# Patient Record
Sex: MALE | Race: WHITE | Employment: UNEMPLOYED | URBAN - METROPOLITAN AREA
[De-identification: names, ages, dates, MRNs, and addresses within clinical notes are randomized per-mention and may not be internally consistent; named-entity substitution may affect disease eponyms.]

---

## 2017-01-10 ENCOUNTER — HOSPITAL ENCOUNTER (EMERGENCY)
Age: 27
Discharge: HOME | End: 2017-01-10
Attending: EMERGENCY MEDICINE | Admitting: EMERGENCY MEDICINE
Payer: COMMERCIAL

## 2017-01-10 ENCOUNTER — APPOINTMENT (OUTPATIENT)
Dept: CT IMAGING | Age: 27
End: 2017-01-10
Attending: EMERGENCY MEDICINE
Payer: COMMERCIAL

## 2017-01-10 VITALS
OXYGEN SATURATION: 95 % | WEIGHT: 255 LBS | HEART RATE: 64 BPM | RESPIRATION RATE: 16 BRPM | HEIGHT: 75 IN | DIASTOLIC BLOOD PRESSURE: 64 MMHG | BODY MASS INDEX: 31.71 KG/M2 | TEMPERATURE: 97.5 F | SYSTOLIC BLOOD PRESSURE: 106 MMHG

## 2017-01-10 DIAGNOSIS — R11.2 NON-INTRACTABLE VOMITING WITH NAUSEA, UNSPECIFIED VOMITING TYPE: ICD-10-CM

## 2017-01-10 DIAGNOSIS — R10.84 ABDOMINAL PAIN, GENERALIZED: Primary | ICD-10-CM

## 2017-01-10 LAB
ALBUMIN SERPL BCP-MCNC: 3.8 G/DL (ref 3.5–5)
ALBUMIN/GLOB SERPL: 0.9 {RATIO} (ref 1.1–2.2)
ALP SERPL-CCNC: 54 U/L (ref 45–117)
ALT SERPL-CCNC: 39 U/L (ref 12–78)
ANION GAP BLD CALC-SCNC: 7 MMOL/L (ref 5–15)
APPEARANCE UR: CLEAR
AST SERPL W P-5'-P-CCNC: 27 U/L (ref 15–37)
BACTERIA URNS QL MICRO: NEGATIVE /HPF
BASOPHILS # BLD AUTO: 0 K/UL (ref 0–0.1)
BASOPHILS # BLD: 1 % (ref 0–1)
BILIRUB SERPL-MCNC: 0.4 MG/DL (ref 0.2–1)
BILIRUB UR QL: NEGATIVE
BUN SERPL-MCNC: 15 MG/DL (ref 6–20)
BUN/CREAT SERPL: 14 (ref 12–20)
CALCIUM SERPL-MCNC: 9.1 MG/DL (ref 8.5–10.1)
CHLORIDE SERPL-SCNC: 103 MMOL/L (ref 97–108)
CO2 SERPL-SCNC: 29 MMOL/L (ref 21–32)
COLOR UR: NORMAL
CREAT SERPL-MCNC: 1.08 MG/DL (ref 0.7–1.3)
EOSINOPHIL # BLD: 0.1 K/UL (ref 0–0.4)
EOSINOPHIL NFR BLD: 2 % (ref 0–7)
EPITH CASTS URNS QL MICRO: NORMAL /LPF
ERYTHROCYTE [DISTWIDTH] IN BLOOD BY AUTOMATED COUNT: 12.1 % (ref 11.5–14.5)
GLOBULIN SER CALC-MCNC: 4.1 G/DL (ref 2–4)
GLUCOSE SERPL-MCNC: 83 MG/DL (ref 65–100)
GLUCOSE UR STRIP.AUTO-MCNC: NEGATIVE MG/DL
HCT VFR BLD AUTO: 43.4 % (ref 36.6–50.3)
HGB BLD-MCNC: 14.7 G/DL (ref 12.1–17)
HGB UR QL STRIP: NEGATIVE
HYALINE CASTS URNS QL MICRO: NORMAL /LPF (ref 0–5)
KETONES UR QL STRIP.AUTO: NEGATIVE MG/DL
LEUKOCYTE ESTERASE UR QL STRIP.AUTO: NEGATIVE
LIPASE SERPL-CCNC: 89 U/L (ref 73–393)
LYMPHOCYTES # BLD AUTO: 30 % (ref 12–49)
LYMPHOCYTES # BLD: 1.3 K/UL (ref 0.8–3.5)
MCH RBC QN AUTO: 30.7 PG (ref 26–34)
MCHC RBC AUTO-ENTMCNC: 33.9 G/DL (ref 30–36.5)
MCV RBC AUTO: 90.6 FL (ref 80–99)
MONOCYTES # BLD: 0.7 K/UL (ref 0–1)
MONOCYTES NFR BLD AUTO: 16 % (ref 5–13)
NEUTS SEG # BLD: 2.3 K/UL (ref 1.8–8)
NEUTS SEG NFR BLD AUTO: 51 % (ref 32–75)
NITRITE UR QL STRIP.AUTO: NEGATIVE
PH UR STRIP: 5.5 [PH] (ref 5–8)
PLATELET # BLD AUTO: 195 K/UL (ref 150–400)
POTASSIUM SERPL-SCNC: 3.8 MMOL/L (ref 3.5–5.1)
PROT SERPL-MCNC: 7.9 G/DL (ref 6.4–8.2)
PROT UR STRIP-MCNC: NEGATIVE MG/DL
RBC # BLD AUTO: 4.79 M/UL (ref 4.1–5.7)
RBC #/AREA URNS HPF: NORMAL /HPF (ref 0–5)
SODIUM SERPL-SCNC: 139 MMOL/L (ref 136–145)
SP GR UR REFRACTOMETRY: 1.02 (ref 1–1.03)
UROBILINOGEN UR QL STRIP.AUTO: 0.2 EU/DL (ref 0.2–1)
WBC # BLD AUTO: 4.4 K/UL (ref 4.1–11.1)
WBC URNS QL MICRO: NORMAL /HPF (ref 0–4)

## 2017-01-10 PROCEDURE — 96361 HYDRATE IV INFUSION ADD-ON: CPT

## 2017-01-10 PROCEDURE — 83690 ASSAY OF LIPASE: CPT | Performed by: EMERGENCY MEDICINE

## 2017-01-10 PROCEDURE — 85025 COMPLETE CBC W/AUTO DIFF WBC: CPT | Performed by: EMERGENCY MEDICINE

## 2017-01-10 PROCEDURE — 81001 URINALYSIS AUTO W/SCOPE: CPT | Performed by: EMERGENCY MEDICINE

## 2017-01-10 PROCEDURE — 74177 CT ABD & PELVIS W/CONTRAST: CPT

## 2017-01-10 PROCEDURE — 36415 COLL VENOUS BLD VENIPUNCTURE: CPT | Performed by: EMERGENCY MEDICINE

## 2017-01-10 PROCEDURE — 96375 TX/PRO/DX INJ NEW DRUG ADDON: CPT

## 2017-01-10 PROCEDURE — 74011250636 HC RX REV CODE- 250/636: Performed by: EMERGENCY MEDICINE

## 2017-01-10 PROCEDURE — 80053 COMPREHEN METABOLIC PANEL: CPT | Performed by: EMERGENCY MEDICINE

## 2017-01-10 PROCEDURE — 96374 THER/PROPH/DIAG INJ IV PUSH: CPT

## 2017-01-10 PROCEDURE — 74011636320 HC RX REV CODE- 636/320: Performed by: RADIOLOGY

## 2017-01-10 PROCEDURE — 99284 EMERGENCY DEPT VISIT MOD MDM: CPT

## 2017-01-10 RX ORDER — ONDANSETRON 2 MG/ML
4 INJECTION INTRAMUSCULAR; INTRAVENOUS
Status: COMPLETED | OUTPATIENT
Start: 2017-01-10 | End: 2017-01-10

## 2017-01-10 RX ORDER — ONDANSETRON 4 MG/1
4 TABLET, ORALLY DISINTEGRATING ORAL
Qty: 10 TAB | Refills: 0 | Status: SHIPPED | OUTPATIENT
Start: 2017-01-10 | End: 2022-05-06

## 2017-01-10 RX ORDER — KETOROLAC TROMETHAMINE 30 MG/ML
15 INJECTION, SOLUTION INTRAMUSCULAR; INTRAVENOUS
Status: COMPLETED | OUTPATIENT
Start: 2017-01-10 | End: 2017-01-10

## 2017-01-10 RX ADMIN — KETOROLAC TROMETHAMINE 15 MG: 30 INJECTION, SOLUTION INTRAMUSCULAR at 13:01

## 2017-01-10 RX ADMIN — ONDANSETRON 4 MG: 2 INJECTION INTRAMUSCULAR; INTRAVENOUS at 13:01

## 2017-01-10 RX ADMIN — IOPAMIDOL 100 ML: 755 INJECTION, SOLUTION INTRAVENOUS at 14:09

## 2017-01-10 RX ADMIN — SODIUM CHLORIDE 1000 ML: 900 INJECTION, SOLUTION INTRAVENOUS at 13:00

## 2017-01-10 NOTE — ED PROVIDER NOTES
HPI Comments: 32 y.o. male with past medical history significant for patent foramen ovale who presents to the ED with chief complaint of constipation. Pt reports constipation onset 2 days ago accompanied by abdominal pain, vomiting 2 nights ago, and diarrhea onset yesterday morning (a couple episodes today), says he is passing \"small amounts of stool\" but has not had any improvement in his sx. Pt states he was seen at [facility] for his sx and had an x-ray done, says he was referred to the ED for further evaluation and for a CT scan to rule out a possible partial bowel obstruction. Pt denies hx of bowel obstruction. Pt states he has been NPO for about 14 hours besides some water but says he has been able to tolerate the water. Pt states he has hx of appendectomy about 2 years ago but denies hx of any other abdominal surgeries. Pt notes he has also recently had cold sx. There are no other acute medical complaints voiced at this time. Social Hx: Denies use of EtOH or drugs. PCP: Miesha Zeng MD    Note written by Kobe Burciaga, as dictated by Henry Cameron MD 12:07 PM     The history is provided by the patient. Past Medical History:   Diagnosis Date    Patent foramen ovale      with right to left shunt on bubble study in 2012 (at [facility])       Past Surgical History:   Procedure Laterality Date    Hx heent      Hx septoplasty      Hx appendectomy           Family History:   Problem Relation Age of Onset    Hypertension Father        Social History     Social History    Marital status: SINGLE     Spouse name: N/A    Number of children: N/A    Years of education: N/A     Occupational History    Not on file.      Social History Main Topics    Smoking status: Never Smoker    Smokeless tobacco: Not on file    Alcohol use No    Drug use: No    Sexual activity: Not on file     Other Topics Concern    Not on file     Social History Narrative         ALLERGIES: Review of patient's allergies indicates no known allergies. Review of Systems   Gastrointestinal: Positive for abdominal pain, constipation, diarrhea and vomiting. All other systems reviewed and are negative. Vitals:    01/10/17 1157   BP: 130/61   Pulse: 65   Resp: 16   Temp: 97.5 °F (36.4 °C)   SpO2: 96%   Weight: 115.7 kg (255 lb)   Height: 6' 3\" (1.905 m)            Physical Exam   Constitutional: He is oriented to person, place, and time. He appears well-developed and well-nourished. No distress. NAD, AxOx4, speaking in complete sentences     HENT:   Head: Normocephalic and atraumatic. Mouth/Throat: Oropharynx is clear and moist. No oropharyngeal exudate. Eyes: Conjunctivae and EOM are normal. Pupils are equal, round, and reactive to light. Right eye exhibits no discharge. Left eye exhibits no discharge. Neck: Normal range of motion. Neck supple. Cardiovascular: Normal rate, regular rhythm, normal heart sounds and intact distal pulses. Exam reveals no gallop and no friction rub. No murmur heard. Pulmonary/Chest: Effort normal and breath sounds normal. No respiratory distress. He has no wheezes. He has no rales. He exhibits no tenderness. Abdominal: Soft. Bowel sounds are normal. He exhibits no distension and no mass. There is no tenderness. There is no rebound and no guarding. Min ttp;     Neg peritoneal signs   Genitourinary:   Genitourinary Comments: Pt denies urinary/ Testicular/ scrotal or penile  complaints   Musculoskeletal: Normal range of motion. He exhibits no edema, tenderness or deformity. Lymphadenopathy:     He has no cervical adenopathy. Neurological: He is alert and oriented to person, place, and time. He has normal reflexes. No cranial nerve deficit. Coordination normal.   Skin: Skin is warm and dry. No rash noted. No erythema. Psychiatric: He has a normal mood and affect. Nursing note and vitals reviewed.        [facility]  ED Course       Procedures    12:49 PM  'doing better'; awaiting results;        2:47 PM 'doing better'; agrees w/ plans';   Tina Pepper's  results have been reviewed with him. He has been counseled regarding his diagnosis. He verbally conveys understanding and agreement of the signs, symptoms, diagnosis, treatment and prognosis and additionally agrees to Call/ Arrange follow up as recommended with Dr. Kathy Bro MD in 24 - 48 hours. He also agrees with the care-plan and conveys that all of his questions have been answered. I have also put together some discharge instructions for him that include: 1) educational information regarding their diagnosis, 2) how to care for their diagnosis at home, as well a 3) list of reasons why they would want to return to the ED prior to their follow-up appointment, should their condition change or for concerns.

## 2017-01-10 NOTE — DISCHARGE INSTRUCTIONS
Abdominal Pain: Care Instructions  Your Care Instructions    Abdominal pain has many possible causes. Some aren't serious and get better on their own in a few days. Others need more testing and treatment. If your pain continues or gets worse, you need to be rechecked and may need more tests to find out what is wrong. You may need surgery to correct the problem. Don't ignore new symptoms, such as fever, nausea and vomiting, urination problems, pain that gets worse, and dizziness. These may be signs of a more serious problem. Your doctor may have recommended a follow-up visit in the next 8 to 12 hours. If you are not getting better, you may need more tests or treatment. The doctor has checked you carefully, but problems can develop later. If you notice any problems or new symptoms, get medical treatment right away. Follow-up care is a key part of your treatment and safety. Be sure to make and go to all appointments, and call your doctor if you are having problems. It's also a good idea to know your test results and keep a list of the medicines you take. How can you care for yourself at home? · Rest until you feel better. · To prevent dehydration, drink plenty of fluids, enough so that your urine is light yellow or clear like water. Choose water and other caffeine-free clear liquids until you feel better. If you have kidney, heart, or liver disease and have to limit fluids, talk with your doctor before you increase the amount of fluids you drink. · If your stomach is upset, eat mild foods, such as rice, dry toast or crackers, bananas, and applesauce. Try eating several small meals instead of two or three large ones. · Wait until 48 hours after all symptoms have gone away before you have spicy foods, alcohol, and drinks that contain caffeine. · Do not eat foods that are high in fat. · Avoid anti-inflammatory medicines such as aspirin, ibuprofen (Advil, Motrin), and naproxen (Aleve).  These can cause stomach upset. Talk to your doctor if you take daily aspirin for another health problem. When should you call for help? Call 911 anytime you think you may need emergency care. For example, call if:  · You passed out (lost consciousness). · You pass maroon or very bloody stools. · You vomit blood or what looks like coffee grounds. · You have new, severe belly pain. Call your doctor now or seek immediate medical care if:  · Your pain gets worse, especially if it becomes focused in one area of your belly. · You have a new or higher fever. · Your stools are black and look like tar, or they have streaks of blood. · You have unexpected vaginal bleeding. · You have symptoms of a urinary tract infection. These may include:  ¨ Pain when you urinate. ¨ Urinating more often than usual.  ¨ Blood in your urine. · You are dizzy or lightheaded, or you feel like you may faint. Watch closely for changes in your health, and be sure to contact your doctor if:  · You are not getting better after 1 day (24 hours). Where can you learn more? Go to http://rockyDuogousaqib.info/. Enter M142 in the search box to learn more about \"Abdominal Pain: Care Instructions. \"  Current as of: May 27, 2016  Content Version: 11.1  © 9529-4211 [employer]. Care instructions adapted under license by [employer] (which disclaims liability or warranty for this information). If you have questions about a medical condition or this instruction, always ask your healthcare professional. [place] [address] any warranty or liability for your use of this information. Nausea and Vomiting: Care Instructions  Your Care Instructions    When you are nauseated, you may feel weak and sweaty and notice a lot of saliva in your mouth. Nausea often leads to vomiting. Most of the time you do not need to worry about nausea and vomiting, but they can be signs of other illnesses.   Two common causes of nausea and vomiting are stomach flu and food poisoning. Nausea and vomiting from viral stomach flu will usually start to improve within 24 hours. Nausea and vomiting from food poisoning may last from 12 to 48 hours. The doctor has checked you carefully, but problems can develop later. If you notice any problems or new symptoms, get medical treatment right away. Follow-up care is a key part of your treatment and safety. Be sure to make and go to all appointments, and call your doctor if you are having problems. It's also a good idea to know your test results and keep a list of the medicines you take. How can you care for yourself at home? · To prevent dehydration, drink plenty of fluids, enough so that your urine is light yellow or clear like water. Choose water and other caffeine-free clear liquids until you feel better. If you have kidney, heart, or liver disease and have to limit fluids, talk with your doctor before you increase the amount of fluids you drink. · Rest in bed until you feel better. · When you are able to eat, try clear soups, mild foods, and liquids until all symptoms are gone for 12 to 48 hours. Other good choices include dry toast, crackers, cooked cereal, and gelatin dessert, such as Jell-O. When should you call for help? Call 911 anytime you think you may need emergency care. For example, call if:  · You passed out (lost consciousness). Call your doctor now or seek immediate medical care if:  · You have symptoms of dehydration, such as:  ¨ Dry eyes and a dry mouth. ¨ Passing only a little dark urine. ¨ Feeling thirstier than usual.  · You have new or worsening belly pain. · You have a new or higher fever. · You vomit blood or what looks like coffee grounds. Watch closely for changes in your health, and be sure to contact your doctor if:  · You have ongoing nausea and vomiting. · Your vomiting is getting worse. · Your vomiting lasts longer than 2 days.   · You are not getting better as expected. Where can you learn more? Go to http://rocky-saqib.info/. Enter 25 [id number] in the search box to learn more about \"Nausea and Vomiting: Care Instructions. \"  Current as of: May 27, 2016  Content Version: 11.1  © 0490-6206 [employer]. Care instructions adapted under license by [employer] (which disclaims liability or warranty for this information). If you have questions about a medical condition or this instruction, always ask your healthcare professional. [place] [address] any warranty or liability for your use of this information. We hope that we have addressed all of your medical concerns. The examination and treatment you received in the Emergency Department were for an emergent problem and were not intended as complete care. It is important that you follow up with your healthcare provider(s) for ongoing care. If your symptoms worsen or do not improve as expected, and you are unable to reach your usual health care provider(s), you should return to the Emergency Department. Today's healthcare is undergoing tremendous change, and patient satisfaction surveys are one of the many tools to assess the quality of medical care. You may receive a survey from the [employer] organization regarding your experience in the Emergency Department. I hope that your experience has been completely positive, particularly the medical care that I provided. As such, please participate in the survey; anything less than excellent does not meet my expectations or intentions. [facility] and 8 [facility] participate in nationally recognized quality of care measures. If your blood pressure is greater than 120/80, as reported below, we urge that you seek medical care to address the potential of high blood pressure, commonly known as hypertension.    Hypertension can be hereditary or can be caused by certain medical conditions, pain, stress, or \"white coat syndrome. \"       Please make an appointment with your health care provider(s) for follow up of your Emergency Department visit. VITALS:   Patient Vitals for the past 8 hrs:   Temp Pulse Resp BP SpO2   01/10/17 1157 97.5 °F (36.4 °C) 65 16 130/61 96 %          Thank you for allowing us to provide you with medical care today. We realize that you have many choices for your emergency care needs. Please choose us in the future for any continued health care needs. Deena Saldaña, [address] [place] [address] 20.   Office: [phone]            Recent Results (from the past 24 hour(s))   METABOLIC PANEL, COMPREHENSIVE    Collection Time: 01/10/17 12:28 PM   Result Value Ref Range    Sodium 139 136 - 145 mmol/L    Potassium 3.8 3.5 - 5.1 mmol/L    Chloride 103 97 - 108 mmol/L    CO2 29 21 - 32 mmol/L    Anion gap 7 5 - 15 mmol/L    Glucose 83 65 - 100 mg/dL    BUN 15 6 - 20 MG/DL    Creatinine 1.08 0.70 - 1.30 MG/DL    BUN/Creatinine ratio 14 12 - 20      GFR est AA >60 >60 ml/min/1.73m2    GFR est non-AA >60 >60 ml/min/1.73m2    Calcium 9.1 8.5 - 10.1 MG/DL    Bilirubin, total 0.4 0.2 - 1.0 MG/DL    ALT 39 12 - 78 U/L    AST 27 15 - 37 U/L    Alk. phosphatase 54 45 - 117 U/L    Protein, total 7.9 6.4 - 8.2 g/dL    Albumin 3.8 3.5 - 5.0 g/dL    Globulin 4.1 (H) 2.0 - 4.0 g/dL    A-G Ratio 0.9 (L) 1.1 - 2.2     CBC WITH AUTOMATED DIFF    Collection Time: 01/10/17 12:28 PM   Result Value Ref Range    WBC 4.4 4.1 - 11.1 K/uL    RBC 4.79 4.10 - 5.70 M/uL    HGB 14.7 12.1 - 17.0 g/dL    HCT 43.4 36.6 - 50.3 %    MCV 90.6 80.0 - 99.0 FL    MCH 30.7 26.0 - 34.0 PG    MCHC 33.9 30.0 - 36.5 g/dL    RDW 12.1 11.5 - 14.5 %    PLATELET 656 813 - 167 K/uL    NEUTROPHILS 51 32 - 75 %    LYMPHOCYTES 30 12 - 49 %    MONOCYTES 16 (H) 5 - 13 %    EOSINOPHILS 2 0 - 7 %    BASOPHILS 1 0 - 1 %    ABS. NEUTROPHILS 2.3 1.8 - 8.0 K/UL    ABS. LYMPHOCYTES 1.3 0.8 - 3.5 K/UL    ABS. MONOCYTES 0.7 0.0 - 1.0 K/UL    ABS. EOSINOPHILS 0.1 0.0 - 0.4 K/UL    ABS. BASOPHILS 0.0 0.0 - 0.1 K/UL   LIPASE    Collection Time: 01/10/17 12:28 PM   Result Value Ref Range    Lipase 89 73 - 393 U/L   URINALYSIS W/MICROSCOPIC    Collection Time: 01/10/17 12:28 PM   Result Value Ref Range    Color YELLOW/STRAW      Appearance CLEAR CLEAR      Specific gravity 1.016 1.003 - 1.030      pH (UA) 5.5 5.0 - 8.0      Protein NEGATIVE  NEG mg/dL    Glucose NEGATIVE  NEG mg/dL    Ketone NEGATIVE  NEG mg/dL    Bilirubin NEGATIVE  NEG      Blood NEGATIVE  NEG      Urobilinogen 0.2 0.2 - 1.0 EU/dL    Nitrites NEGATIVE  NEG      Leukocyte Esterase NEGATIVE  NEG      WBC 0-4 0 - 4 /hpf    RBC 0-5 0 - 5 /hpf    Epithelial cells FEW FEW /lpf    Bacteria NEGATIVE  NEG /hpf    Hyaline Cast 0-2 0 - 5 /lpf       Ct Abd Pelv W Cont    Result Date: 1/10/2017  EXAM:  CT ABDOMEN PELVIS WITH CONTRAST INDICATION:  Abd Pain, constipation, diarrhea for 3 days COMPARISON: CT of 5/20/2014. CONTRAST:  100 mL of Isovue-370. TECHNIQUE:  Multislice helical CT was performed from the diaphragm to the symphysis pubis during uneventful rapid bolus intravenous contrast administration. Oral contrast was not administered. Contiguous 5 mm axial images were reconstructed and lung and soft tissue windows were generated. Coronal and sagittal reformations were generated. CT dose reduction was achieved through use of a standardized protocol tailored for this examination and automatic exposure control for dose modulation. Adaptive statistical iterative reconstruction (ASIR) was utilized. FINDINGS: The visualized portions of the lung bases are clear. There are no focal abnormalities within the liver, spleen, pancreas, adrenal glands or kidneys. The aorta tapers without aneurysm. There is no retroperitoneal adenopathy or mass. No significant abnormalities are demonstrated in the stomach, small bowel, or colon.  There is no evidence of bowel obstruction or bowel wall thickening. The appendix is surgically absent. There is no ascites or free intraperitoneal air. The urinary bladder and prostate are unremarkable. There is no pelvic mass, fluid collection, or adenopathy. IMPRESSION: No acute findings in the abdomen or pelvis. No significant abnormalities. [facility]

## 2017-01-10 NOTE — ED TRIAGE NOTES
Pt complains of abdominal pain with constipation and intermittent diarrhea starting Sunday. Sent from [facility] for possible bowl obstruction.

## 2017-10-09 ENCOUNTER — HOSPITAL ENCOUNTER (OUTPATIENT)
Dept: GENERAL RADIOLOGY | Age: 27
Discharge: HOME | End: 2017-10-09
Payer: COMMERCIAL

## 2017-10-09 DIAGNOSIS — R52 PAIN: ICD-10-CM

## 2017-10-09 DIAGNOSIS — M51.04 HNP (HERNIATED NUCLEUS PULPOSUS WITH MYELOPATHY), THORACIC: ICD-10-CM

## 2017-10-09 PROCEDURE — 72072 X-RAY EXAM THORAC SPINE 3VWS: CPT

## 2017-10-16 ENCOUNTER — HOSPITAL ENCOUNTER (OUTPATIENT)
Dept: MRI IMAGING | Age: 27
Discharge: HOME | End: 2017-10-16
Attending: FAMILY MEDICINE
Payer: COMMERCIAL

## 2017-10-16 DIAGNOSIS — M51.24 HNP (HERNIATED NUCLEUS PULPOSUS), THORACIC: ICD-10-CM

## 2017-10-16 PROCEDURE — 72146 MRI CHEST SPINE W/O DYE: CPT

## 2021-09-18 ENCOUNTER — HOSPITAL ENCOUNTER (EMERGENCY)
Age: 31
Discharge: HOME | End: 2021-09-18
Attending: EMERGENCY MEDICINE
Payer: MEDICAID

## 2021-09-18 ENCOUNTER — APPOINTMENT (OUTPATIENT)
Dept: GENERAL RADIOLOGY | Age: 31
End: 2021-09-18
Attending: EMERGENCY MEDICINE
Payer: MEDICAID

## 2021-09-18 VITALS
HEIGHT: 75 IN | WEIGHT: 245 LBS | RESPIRATION RATE: 20 BRPM | BODY MASS INDEX: 30.46 KG/M2 | SYSTOLIC BLOOD PRESSURE: 105 MMHG | HEART RATE: 94 BPM | TEMPERATURE: 97.7 F | OXYGEN SATURATION: 97 % | DIASTOLIC BLOOD PRESSURE: 65 MMHG

## 2021-09-18 DIAGNOSIS — R11.2 NON-INTRACTABLE VOMITING WITH NAUSEA, UNSPECIFIED VOMITING TYPE: Primary | ICD-10-CM

## 2021-09-18 LAB
ALBUMIN SERPL-MCNC: 4.4 G/DL (ref 3.5–5)
ALBUMIN/GLOB SERPL: 1 {RATIO} (ref 1.1–2.2)
ALP SERPL-CCNC: 54 U/L (ref 45–117)
ALT SERPL-CCNC: 44 U/L (ref 12–78)
ANION GAP SERPL CALC-SCNC: 8 MMOL/L (ref 5–15)
AST SERPL-CCNC: 68 U/L (ref 15–37)
BASOPHILS # BLD: 0 K/UL (ref 0–0.1)
BASOPHILS NFR BLD: 0 % (ref 0–1)
BILIRUB SERPL-MCNC: 0.7 MG/DL (ref 0.2–1)
BUN SERPL-MCNC: 21 MG/DL (ref 6–20)
BUN/CREAT SERPL: 20 (ref 12–20)
CALCIUM SERPL-MCNC: 9 MG/DL (ref 8.5–10.1)
CHLORIDE SERPL-SCNC: 104 MMOL/L (ref 97–108)
CO2 SERPL-SCNC: 24 MMOL/L (ref 21–32)
CREAT SERPL-MCNC: 1.04 MG/DL (ref 0.7–1.3)
DIFFERENTIAL METHOD BLD: ABNORMAL
EOSINOPHIL # BLD: 0 K/UL (ref 0–0.4)
EOSINOPHIL NFR BLD: 0 % (ref 0–7)
ERYTHROCYTE [DISTWIDTH] IN BLOOD BY AUTOMATED COUNT: 12 % (ref 11.5–14.5)
GLOBULIN SER CALC-MCNC: 4.2 G/DL (ref 2–4)
GLUCOSE SERPL-MCNC: 103 MG/DL (ref 65–100)
HCT VFR BLD AUTO: 43 % (ref 36.6–50.3)
HGB BLD-MCNC: 14.6 G/DL (ref 12.1–17)
IMM GRANULOCYTES # BLD AUTO: 0 K/UL (ref 0–0.04)
IMM GRANULOCYTES NFR BLD AUTO: 0 % (ref 0–0.5)
LIPASE SERPL-CCNC: 46 U/L (ref 73–393)
LYMPHOCYTES # BLD: 0.3 K/UL (ref 0.8–3.5)
LYMPHOCYTES NFR BLD: 3 % (ref 12–49)
MCH RBC QN AUTO: 30.3 PG (ref 26–34)
MCHC RBC AUTO-ENTMCNC: 34 G/DL (ref 30–36.5)
MCV RBC AUTO: 89.2 FL (ref 80–99)
MONOCYTES # BLD: 0.7 K/UL (ref 0–1)
MONOCYTES NFR BLD: 6 % (ref 5–13)
NEUTS SEG # BLD: 10.2 K/UL (ref 1.8–8)
NEUTS SEG NFR BLD: 91 % (ref 32–75)
NRBC # BLD: 0 K/UL (ref 0–0.01)
NRBC BLD-RTO: 0 PER 100 WBC
PLATELET # BLD AUTO: 231 K/UL (ref 150–400)
PMV BLD AUTO: 10.4 FL (ref 8.9–12.9)
POTASSIUM SERPL-SCNC: 3.8 MMOL/L (ref 3.5–5.1)
PROT SERPL-MCNC: 8.6 G/DL (ref 6.4–8.2)
RBC # BLD AUTO: 4.82 M/UL (ref 4.1–5.7)
RBC MORPH BLD: ABNORMAL
SODIUM SERPL-SCNC: 136 MMOL/L (ref 136–145)
WBC # BLD AUTO: 11.2 K/UL (ref 4.1–11.1)

## 2021-09-18 PROCEDURE — 85025 COMPLETE CBC W/AUTO DIFF WBC: CPT

## 2021-09-18 PROCEDURE — 83690 ASSAY OF LIPASE: CPT

## 2021-09-18 PROCEDURE — 96361 HYDRATE IV INFUSION ADD-ON: CPT

## 2021-09-18 PROCEDURE — 96374 THER/PROPH/DIAG INJ IV PUSH: CPT

## 2021-09-18 PROCEDURE — 36415 COLL VENOUS BLD VENIPUNCTURE: CPT

## 2021-09-18 PROCEDURE — 80053 COMPREHEN METABOLIC PANEL: CPT

## 2021-09-18 PROCEDURE — 74011250636 HC RX REV CODE- 250/636: Performed by: EMERGENCY MEDICINE

## 2021-09-18 PROCEDURE — 74018 RADEX ABDOMEN 1 VIEW: CPT

## 2021-09-18 PROCEDURE — 99281 EMR DPT VST MAYX REQ PHY/QHP: CPT

## 2021-09-18 RX ORDER — ONDANSETRON 2 MG/ML
4 INJECTION INTRAMUSCULAR; INTRAVENOUS ONCE
Status: COMPLETED | OUTPATIENT
Start: 2021-09-18 | End: 2021-09-18

## 2021-09-18 RX ORDER — ONDANSETRON 4 MG/1
4 TABLET, ORALLY DISINTEGRATING ORAL
Qty: 12 TABLET | Refills: 0 | Status: SHIPPED | OUTPATIENT
Start: 2021-09-18 | End: 2022-05-06

## 2021-09-18 RX ADMIN — SODIUM CHLORIDE 1000 ML: 9 INJECTION, SOLUTION INTRAVENOUS at 17:07

## 2021-09-18 RX ADMIN — ONDANSETRON 4 MG: 2 INJECTION INTRAMUSCULAR; INTRAVENOUS at 17:06

## 2021-09-18 NOTE — ED PROVIDER NOTES
Date of Service:  9/18/2021    Patient:  Laura Mendenhall    Chief Complaint:  Vomiting       HPI:  Laura Mendenhall is a 32 y.o.  male who presents for evaluation of vomiting. Patient states yesterday he felt well. Last night he ate some ground turkey and vegetables woke up in the middle of the night with vomiting. He had multiple episodes of vomiting and now is having dry heaving with no p.o. intake. He describes a generalized abdominal cramping but no real point tenderness. He states he feels bloated and constipated at the same time noting that he had a bowel movement a day or 2 ago but has not had any bowel movements today. He arrives awake alert and oriented without other acute complaints. Pain is mild, 3 out of 10 generalized crampy type discomfort. No history of the like. Appendix out 10 years ago. Patient does not smoke drink or use illicit substances. Otherwise no trauma, no dysuria, no flank pain or back pain.            Past Medical History:   Diagnosis Date    Patent foramen ovale     with right to left shunt on bubble study in 2012 (at [facility])       Past Surgical History:   Procedure Laterality Date    HX APPENDECTOMY      HX HEENT      HX SEPTOPLASTY           Family History:   Problem Relation Age of Onset    Hypertension Father        Social History     Socioeconomic History    Marital status:      Spouse name: Not on file    Number of children: Not on file    Years of education: Not on file    Highest education level: Not on file   Occupational History    Not on file   Tobacco Use    Smoking status: Never Smoker   Substance and Sexual Activity    Alcohol use: No     Alcohol/week: 0.0 standard drinks    Drug use: No    Sexual activity: Not on file   Other Topics Concern    Not on file   Social History Narrative    Not on file     Social Determinants of Health     Financial Resource Strain:     Difficulty of Paying Living Expenses:    Food Insecurity:     Worried About Running Out of Food in the Last Year:     Dianna of Food in the Last Year:    Transportation Needs:     Lack of Transportation (Medical):  Lack of Transportation (Non-Medical):    Physical Activity:     Days of Exercise per Week:     Minutes of Exercise per Session:    Stress:     Feeling of Stress :    Social Connections:     Frequency of Communication with Friends and Family:     Frequency of Social Gatherings with Friends and Family:     Attends Zoroastrian Services:     Active Member of Clubs or Organizations:     Attends Club or Organization Meetings:     Marital Status:    Intimate Partner Violence:     Fear of Current or Ex-Partner:     Emotionally Abused:     Physically Abused:     Sexually Abused: ALLERGIES: Dilaudid [hydromorphone]    Review of Systems   Constitutional: Negative for fever. HENT: Negative for hearing loss. Eyes: Negative for visual disturbance. Respiratory: Negative for shortness of breath. Cardiovascular: Negative for chest pain. Gastrointestinal: Positive for abdominal pain, nausea and vomiting. Negative for constipation. Genitourinary: Negative for flank pain. Musculoskeletal: Negative for back pain. Skin: Negative for rash. Neurological: Negative for dizziness and light-headedness. Psychiatric/Behavioral: Negative for confusion. Vitals:    09/18/21 1413   BP: 105/65   Pulse: 94   Resp: 20   Temp: 97.7 °F (36.5 °C)   SpO2: 97%   Weight: 111.1 kg (245 lb)   Height: 6' 3\" (1.905 m)            Physical Exam  Vitals and nursing note reviewed. Constitutional:       Appearance: Normal appearance. HENT:      Head: Normocephalic. Nose: Nose normal.   Eyes:      General: No scleral icterus. Cardiovascular:      Rate and Rhythm: Normal rate. Pulmonary:      Effort: Pulmonary effort is normal. No respiratory distress. Abdominal:      General: Abdomen is flat. Tenderness:  There is abdominal tenderness (general, non focal). Musculoskeletal:         General: Normal range of motion. Skin:     General: Skin is warm. Capillary Refill: Capillary refill takes less than 2 seconds. Neurological:      Mental Status: He is alert and oriented to person, place, and time. Psychiatric:         Mood and Affect: Mood normal.          [facility]  ED Course as of Sep 18 2204   Sat Sep 18, 2021   1643 Patient still sick in stomach. Will now get fluids and zofran, will reassess    [GG]      ED Course User Index  [GG] Pepe Olson,      VITAL SIGNS:  No data found. LABS:  No results found for this or any previous visit (from the past 6 hour(s)). IMAGING:  XR ABD (KUB)   Final Result   Normal abdomen. Medications During Visit:  Medications   sodium chloride 0.9 % bolus infusion 1,000 mL (0 mL IntraVENous IV Completed 9/18/21 1755)   ondansetron (ZOFRAN) injection 4 mg (4 mg IntraVENous Given 9/18/21 1706)         DECISION MAKING:  Larisa Smith is a 32 y.o. male who comes in as above. On repeat exam, patient feels much better after getting Zofran and IV fluids. Will discharge home with medicines as below. Most likely some type of food poisoning. Return as needed      IMPRESSION:  1. Non-intractable vomiting with nausea, unspecified vomiting type        DISPOSITION:  Discharged      Discharge Medication List as of 9/18/2021  5:44 PM      START taking these medications    Details   !! ondansetron (ZOFRAN ODT) 4 mg disintegrating tablet Take 1 Tablet by mouth every eight (8) hours as needed for Nausea for up to 12 doses. , Print, Disp-12 Tablet, R-0       !! - Potential duplicate medications found. Please discuss with provider. CONTINUE these medications which have NOT CHANGED    Details   !! ondansetron (ZOFRAN ODT) 4 mg disintegrating tablet Take 1 Tab by mouth every eight (8) hours as needed for Nausea. , Print, Disp-10 Tab, R-0      aspirin delayed-release 81 mg tablet Take  by mouth daily., Historical Med      protein powd Take  by mouth., Historical Med      oxyCODONE-acetaminophen (PERCOCET) 5-325 mg per tablet Take 1 tablet by mouth every four (4) hours as needed for Pain., Print, Disp-20 tablet, R-0       !! - Potential duplicate medications found. Please discuss with provider. Follow-up Information     Follow up With Specialties Details Why Contact Info    Lulu Shahid MD [facility] Medicine Schedule an appointment as soon as possible for a visit   [place] [phone]      [facility] EMERGENCY DEPT Emergency Medicine Go to  If symptoms worsen, As needed [address] [place] [address]  [phone]            The patient is asked to follow-up with their primary care provider in the next several days. They are to call tomorrow for an appointment. The patient is asked to return promptly for any increased concerns or worsening of symptoms. They can return to this emergency department or any other emergency department.     Procedures

## 2021-09-18 NOTE — ED TRIAGE NOTES
Was nauseous this AM and has vomited 10 times so far today. Last BM was 2 days ago and was normal, but today he feels like he has to have a BM and is unable to. LLQ abdominal pressure.

## 2022-04-25 ENCOUNTER — NURSE TRIAGE (OUTPATIENT)
Dept: OTHER | Facility: CLINIC | Age: 32
End: 2022-04-25

## 2022-04-25 NOTE — TELEPHONE ENCOUNTER
Reason for Disposition   Patient wants to be seen    Protocols used: ABDOMINAL PAIN - UPPER-ADULT-[place]

## 2022-04-25 NOTE — TELEPHONE ENCOUNTER
Received call from Roby Mae at [facility] with [employer]. Subjective: Caller states \"Yunier was sick 2 weeks ago with a really bad cold\"     Current Symptoms: A lot of gas last night. No vomiting or diarrhea. \"soft serve\" stool. No abdominal pain today. Better today. LUQ pain yesterday. Onset: 1 day ago; improving    Associated Symptoms: NA    Pain Severity: 0/10    Temperature: denies fever     What has been tried:     LMP: NA Pregnant: NA    Recommended disposition: See in Office Today    Care advice provided, patient verbalizes understanding; denies any other questions or concerns; instructed to call back for any new or worsening symptoms. Call disconnected during triage. Attempted to call back x2 and both times went straight to voicemail. Caller called back and was able to complete triage. Attention Provider: Thank you for allowing me to participate in the care of your patient. The patient was connected to triage in response to information provided to the [facility]. Please do not respond through this encounter as the response is not directed to a shared pool.     Reason for Disposition   Unable to complete triage due to phone connection issues    Protocols used: NO CONTACT OR DUPLICATE CONTACT CALL-ADULT-

## 2022-05-04 ENCOUNTER — OFFICE VISIT (OUTPATIENT)
Dept: FAMILY MEDICINE CLINIC | Age: 32
End: 2022-05-04
Payer: MEDICAID

## 2022-05-04 VITALS
BODY MASS INDEX: 31.08 KG/M2 | DIASTOLIC BLOOD PRESSURE: 60 MMHG | HEART RATE: 88 BPM | TEMPERATURE: 98 F | WEIGHT: 250 LBS | OXYGEN SATURATION: 96 % | HEIGHT: 75 IN | SYSTOLIC BLOOD PRESSURE: 96 MMHG

## 2022-05-04 DIAGNOSIS — Z00.00 VISIT FOR WELL MAN HEALTH CHECK: Primary | ICD-10-CM

## 2022-05-04 DIAGNOSIS — Z00.00 ENCOUNTER FOR MEDICAL EXAMINATION TO ESTABLISH CARE: ICD-10-CM

## 2022-05-04 PROCEDURE — 99385 PREV VISIT NEW AGE 18-39: CPT | Performed by: FAMILY MEDICINE

## 2022-05-04 RX ORDER — ASPIRIN 81 MG/1
81 TABLET ORAL DAILY
COMMUNITY
End: 2022-05-06

## 2022-05-04 NOTE — PROGRESS NOTES
[address] Medicine Office Visit     Assessment/ Plan: Rosalva Hannon is a 28 y.o. male presenting for:    Establish Care - PMH, PSH, family history, social history, medications and allergies were reviewed and updated. -- UTD on vaccines  -- counseled on screening lipids, A1C around 35  -- no indication for STI screening  -- reviewed diet, exercise  -- encouraged to establish dental home   -- depression screen negative, good family support     25 minutes were spent on the day of this encounter both with the patient and in related activities including chart review, care coordination and counseling. Patient instructions were discussed and/or provided in the AVS. The patient understands and agrees to the plan. RETURN TO CARE: prn  No future appointments. Subjective:  Chief Complaint   Patient presents with    New Patient       HPI: Ramírez Gunn is a 28 y.o. male with no significant PMH here for establishing care. - no specific concerns    HCM  - Tdap 2020  - Menactra only   - childhood vaccines  - no covid vaccine     - no dental home     I have reviewed the patients problem list, current medications, allergies, family, medical and social history. I have updated them as needed. Review of Systems  See HPI. Objective:  Visit Vitals  BP 96/60 (BP 1 Location: Left upper arm, BP Patient Position: Sitting, BP Cuff Size: Large adult)   Pulse 88   Temp 98 °F (36.7 °C) (Oral)   Ht 6' 3\" (1.905 m)   Wt 250 lb (113.4 kg)   SpO2 96%   BMI 31.25 kg/m²     Physical Exam  Vitals and nursing note reviewed. Constitutional:       General: He is not in acute distress. Appearance: Normal appearance. HENT:      Head: Normocephalic and atraumatic. Right Ear: Tympanic membrane normal.      Left Ear: Tympanic membrane normal.      Nose: Nose normal. No congestion. Eyes:      Extraocular Movements: Extraocular movements intact.       Conjunctiva/sclera: Conjunctivae normal. Neck:      Comments: No thyromegaly  Cardiovascular:      Rate and Rhythm: Normal rate and regular rhythm. Pulses: Normal pulses. Heart sounds: No murmur heard. Pulmonary:      Effort: Pulmonary effort is normal. No respiratory distress. Breath sounds: Normal breath sounds. Musculoskeletal:      Cervical back: Neck supple. Lymphadenopathy:      Cervical: No cervical adenopathy. Skin:     General: Skin is warm and dry. Neurological:      General: No focal deficit present. Mental Status: He is alert. Psychiatric:         Mood and Affect: Mood normal.         Thought Content:  Thought content normal.         Fiorella Magana [place]

## 2022-05-04 NOTE — PROGRESS NOTES
Chief Complaint   Patient presents with   [facility] Patient     Visit Vitals  BP 96/60 (BP 1 Location: Left upper arm, BP Patient Position: Sitting, BP Cuff Size: Large adult)   Pulse 88   Temp 98 °F (36.7 °C) (Oral)   Ht 6' 3\" (1.905 m)   Wt 250 lb (113.4 kg)   SpO2 96%   BMI 31.25 kg/m²     Health Maintenance Due   Topic Date Due    Hepatitis C Screening  Never done    Depression Screen  Never done    COVID-19 Vaccine (1) Never done    DTaP/Tdap/Td series (1 - Tdap) Never done     1. Have you been to the ER, urgent care clinic since your last visit? Hospitalized since your last visit?no    2. Have you seen or consulted any other health care providers outside of the [address] [place] [address] since your last visit? Include any pap smears or colon screening.  No

## 2022-05-26 ENCOUNTER — VIRTUAL VISIT (OUTPATIENT)
Dept: FAMILY MEDICINE CLINIC | Age: 32
End: 2022-05-26
Payer: MEDICAID

## 2022-05-26 DIAGNOSIS — H10.10 ALLERGIC CONJUNCTIVITIS, UNSPECIFIED LATERALITY: Primary | ICD-10-CM

## 2022-05-26 PROCEDURE — 99441 PR PHYS/QHP TELEPHONE EVALUATION 5-10 MIN: CPT | Performed by: FAMILY MEDICINE

## 2022-05-26 NOTE — PROGRESS NOTES
Aron Pepper  28 y.o. male  1990  [address]   [id number]   Joelle Figueroa MD       Encounter Date and Time: May 26, 2022 at 10:12 AM       Alfred Blue, who was evaluated through a synchronous (real-time) audio only encounter, and/or his healthcare decision maker, is aware that it is a billable service, which includes applicable co-pays, with coverage as determined by his insurance carrier. He provided verbal consent to proceed and patient identification was verified. This visit was conducted pursuant to the emergency declaration under the [facility], [address] [place] [address] authority and the [employer] and [employer] General Act. A caregiver was present when appropriate. Ability to conduct physical exam was limited. The patient was located at home in a state where the provider was licensed to provide care. --Joelle Figueroa MD on 5/26/2022 at 10:12 AM           No chief complaint on file. History of Present Illness   Alfred Blue is a 28 y.o. male was evaluated by synchronous (real-time) audio-video technology from home, through a secure patient portal.    Alfred Blue is a 28 y.o. male here today to address the following issues:  No chief complaint on file. Onset: 3 days  Symptoms: itchy eyes, redness in eye yesterday. No crusting reported in the eye. Some congestion. No issues with his vision. All started after going yard work. Alleviating Factors: rinsing with sterile eye wash  Aggravating Factors: nothing  Sick Contacts: None  Risk Factors: seasonal allergies, and cat allergies and feels like he has been around a cat. Used to have allergy shots, but not any more. Review of Systems   ROS    Vitals/Objective:     O: Patient speaking in complete sentences without effort. Normal speech and cooperative.      Due to this being a TeleHealth evaluation, many elements of the physical examination are unable to be assessed. Assessment and Plan:       1. Allergic conjunctivitis, unspecified laterality    Start zyrtec twice a day for up to 3-7 days until he feels better and then once a day for 2-3 weeks. Recommend getting non-medicated saline individual eye packs from [facility] and put them in the fridge to cool them and can use this liberally as well to help with symptoms. Last annual wellness was 4-5 years ago. States he has been caring for his wife who has cancer and helping with his child. Will have office all to schedule an annual wellness. Time spent in direct conversation with the patient to include medical condition(s) discussed, assessment and treatment plan:    Patient encounter was >9 minutes was spent of total time spent on the date of the encounter: preparing to see the patient(reviewing prior notes and tests), obtaining and/or reviewing separately obtained history, performing a medially appropriate examination and/or evaluation, counseling and educating the patient, and care coordination(not separately reported) including reaching out to our office to schedule an annual wellness. We discussed the expected course, resolution and complications of the diagnosis(es) in detail. Medication risks, benefits, costs, interactions, and alternatives were discussed as indicated. I advised him to contact the office if his condition worsens, changes or fails to improve as anticipated. He expressed understanding with the diagnosis(es) and plan. Patient understands that this encounter was a temporary measure, and the importance of further follow up and examination was emphasized. Patient verbalized understanding. Patient informed to follow up: Follow-up and Dispositions  ·   Return in about 1 month (around 6/26/2022) for Annual Wellness . Electronically Signed: Gilmer Sarmiento MD    CPT Codes 44602-86579 for Established Patients may apply to this Telehealth Visit. POS code: 18. Modifier GT    Larisa Smith is a 28 y.o. male who was evaluated by an audio only encounter for concerns as above. Patient identification was verified prior to start of the visit. A caregiver was present when appropriate. Due to this being a TeleHealth encounter (During [facility]-59 public health emergency), evaluation of the following organ systems was limited: Vitals/Constitutional/EENT/Resp/CV/GI//MS/Neuro/Skin/Heme-Lymph-Imm. Pursuant to the emergency declaration under the [address] [place] [address], [facility] waiver authority and the [employer] Act, this Virtual Visit was conducted, with patient's (and/or legal guardian's) consent, to reduce the patient's risk of exposure to COVID-19 and provide necessary medical care. Services were provided through a synchronous discussion virtually to substitute for in-person clinic visit. I was at home. The patient was at home. History   Patients past medical, surgical and family histories were reviewed and updated. Past Medical History:   Diagnosis Date    Palpitations     Patent foramen ovale     with right to left shunt on bubble study in 2012 (at [facility])     Past Surgical History:   Procedure Laterality Date    HX APPENDECTOMY      HX HEENT      wisdom teeth     HX SEPTOPLASTY       Family History   Problem Relation Age of Onset    Hypertension Father     Other Father         pre-diabetic     Social History     Tobacco Use    Smoking status: Never Smoker    Smokeless tobacco: Never Used   Substance Use Topics    Alcohol use: No     Alcohol/week: 0.0 standard drinks    Drug use: No     Patient Active Problem List   Diagnosis Code    Palpitations R00.2    Racing heart beat R00.0          Current Medications/Allergies   Medications and Allergies reviewed:       Allergies   Allergen Reactions    Dilaudid [Hydromorphone] Anxiety

## 2022-10-27 ENCOUNTER — VIRTUAL VISIT (OUTPATIENT)
Dept: FAMILY MEDICINE CLINIC | Age: 32
End: 2022-10-27
Payer: MEDICAID

## 2022-10-27 DIAGNOSIS — J06.9 VIRAL URI WITH COUGH: Primary | ICD-10-CM

## 2022-10-27 PROCEDURE — 99213 OFFICE O/P EST LOW 20 MIN: CPT

## 2022-10-27 NOTE — PROGRESS NOTES
Samuel Pepper  28 y.o. male  1990  [address]  [id number]   [address]:    Telemedicine Progress Note  Fadi Azul MD       Encounter Date and Time: October 27th at 10:04 AM    Consent: Ronal Phillips, who was seen by synchronous (real-time) audio-video technology, and/or his healthcare decision maker, is aware that this patient-initiated, Telehealth encounter on 10/27/2022 is a billable service, with coverage as determined by his insurance carrier. He is aware that he may receive a bill and has provided verbal consent to proceed: Yes. Chief Complaint   Patient presents with    URI     Nasal congestion for 3 days      History of Present Illness   Ronal Phillips is a 28 y.o. male was evaluated by synchronous (real-time) audio-video technology from home, through a secure patient portal.    URI:   - Patient presents with URI concerns (congestion, general fatigue, cough) for the past 3 days after caring for his young son who has croup  - Denies any chest pain or trouble breath, no cough or sore throat, no fevers    - has tried Tylenol for pain relief in sinuses but has not tried anything else  - has used nasal saline in the past with relief but has not tried anything yet with this illness. - no history of asthma or COPD  - his main concern is making sure he is healthy so he can care for his son      Review of Systems   Review of Systems   Constitutional:  Positive for malaise/fatigue. Negative for chills and fever. HENT:  Negative for congestion, ear pain, hearing loss and sore throat. Eyes:  Negative for blurred vision and pain. Respiratory:  Positive for cough. Negative for sputum production, shortness of breath and wheezing. Cardiovascular:  Negative for chest pain, palpitations and leg swelling. Gastrointestinal:  Negative for abdominal pain, diarrhea, nausea and vomiting. Genitourinary:  Negative for dysuria. Musculoskeletal:  Negative for back pain. Skin:  Negative for rash. Neurological:  Negative for sensory change, focal weakness and headaches. Endo/Heme/Allergies:  Does not bruise/bleed easily. Psychiatric/Behavioral:  Negative for depression and suicidal ideas. Vitals/Objective:     General: alert, cooperative, no distress   Mental  status: mental status: alert, oriented to person, place, and time, normal mood, behavior, speech, dress, motor activity, and thought processes   Resp: resp: normal effort and no respiratory distress   Neuro: neuro: no gross deficits   Skin: skin: no discoloration or lesions of concern on visible areas   Due to this being a TeleHealth evaluation, many elements of the physical examination are unable to be assessed. Assessment and Plan:   Time-based coding, delete if not needed: I spent at least 15 minutes with this established patient, and >50% of the time was spent counseling and/or coordinating care regarding URI    Assessment/Plan:    URI:  - suspect likely viral etiology, patient afebrile with no shortness of breath or sore throat. Most likely rhinovirus vs flu vs Covid. Patient would not qualify for Paxovid treatment, encouraged home testing as desired. - Treat congestion and cough symptomatically with Mucine and genia pot.   - Patient to present for in patient evaluation if symptoms worsen or fail to improve. Problem List Items Addressed This Visit    None  Visit Diagnoses       Viral URI with cough    -  Primary             We discussed the expected course, resolution and complications of the diagnosis(es) in detail. Medication risks, benefits, costs, interactions, and alternatives were discussed as indicated. I advised him to contact the office if his condition worsens, changes or fails to improve as anticipated. He expressed understanding with the diagnosis(es) and plan.  Patient understands that this encounter was a temporary measure, and the importance of further follow up and examination was emphasized. Patient verbalized understanding. Patient informed to follow up: PRN. Electronically Signed: Diane Harper MD      Humera Thomas is a 28 y.o. male who was evaluated by an audio-video encounter for concerns as above. Patient identification was verified prior to start of the visit. A caregiver was present when appropriate. Due to this being a TeleHealth encounter (During [facility]-14 public health emergency), evaluation of the following organ systems was limited: Vitals/Constitutional/EENT/Resp/CV/GI//MS/Neuro/Skin/Heme-Lymph-Imm. Pursuant to the emergency declaration under the [facility], [facility] waiver authority and the [employer] Act, this Virtual Visit was conducted, with patient's (and/or legal guardian's) consent, to reduce the patient's risk of exposure to COVID-19 and provide necessary medical care. Services were provided through a synchronous discussion virtually to substitute for in-person clinic visit. I was at home. The patient was at home. History   Patients past medical, surgical and family histories were reviewed and updated.       Past Medical History:   Diagnosis Date    Palpitations     Patent foramen ovale     with right to left shunt on bubble study in 2012 (at [facility])     Past Surgical History:   Procedure Laterality Date    HX APPENDECTOMY      HX HEENT      wisdom teeth     HX SEPTOPLASTY       Family History   Problem Relation Age of Onset    Hypertension Father     Other Father         pre-diabetic     Social History     Tobacco Use    Smoking status: Never    Smokeless tobacco: Never   Substance Use Topics    Alcohol use: No     Alcohol/week: 0.0 standard drinks    Drug use: No     Patient Active Problem List   Diagnosis Code    Palpitations R00.2    Racing heart beat R00.0          Current Medications/Allergies   Medications and Allergies reviewed:       Allergies   Allergen Reactions    Dilaudid [Hydromorphone] Anxiety

## 2022-10-27 NOTE — PROGRESS NOTES
[address] Medicine Residency Attending Addendum:  Dr. Lion Craig MD,  the patient and I were not physically present during this encounter. The resident and I are concurrently monitoring the patient care through appropriate telecommunication technology. I discussed the findings, assessment and plan with the resident and agree with the resident's findings and plan as documented in the resident's note.       Felix Samayoa MD

## 2023-02-23 ENCOUNTER — OFFICE VISIT (OUTPATIENT)
Dept: FAMILY MEDICINE CLINIC | Age: 33
End: 2023-02-23
Payer: MEDICAID

## 2023-02-23 VITALS
DIASTOLIC BLOOD PRESSURE: 66 MMHG | BODY MASS INDEX: 32.45 KG/M2 | OXYGEN SATURATION: 97 % | WEIGHT: 261 LBS | TEMPERATURE: 97.3 F | HEIGHT: 75 IN | HEART RATE: 90 BPM | SYSTOLIC BLOOD PRESSURE: 110 MMHG | RESPIRATION RATE: 17 BRPM

## 2023-02-23 DIAGNOSIS — K59.00 CONSTIPATION, UNSPECIFIED CONSTIPATION TYPE: ICD-10-CM

## 2023-02-23 DIAGNOSIS — R10.12 LEFT UPPER QUADRANT ABDOMINAL PAIN: Primary | ICD-10-CM

## 2023-02-23 DIAGNOSIS — Z83.79 FAMILY HISTORY OF CELIAC DISEASE: ICD-10-CM

## 2023-02-23 DIAGNOSIS — Q21.10 ATRIAL SEPTAL DEFECT: ICD-10-CM

## 2023-02-23 LAB
ALBUMIN SERPL-MCNC: 4.4 G/DL (ref 3.5–5)
ALBUMIN/GLOB SERPL: 1.2 (ref 1.1–2.2)
ALP SERPL-CCNC: 50 U/L (ref 45–117)
ALT SERPL-CCNC: 30 U/L (ref 12–78)
ANION GAP SERPL CALC-SCNC: 3 MMOL/L (ref 5–15)
AST SERPL-CCNC: 20 U/L (ref 15–37)
BILIRUB SERPL-MCNC: 0.3 MG/DL (ref 0.2–1)
BILIRUB UR QL STRIP: NEGATIVE
BUN SERPL-MCNC: 19 MG/DL (ref 6–20)
BUN/CREAT SERPL: 18 (ref 12–20)
CALCIUM SERPL-MCNC: 10.2 MG/DL (ref 8.5–10.1)
CHLORIDE SERPL-SCNC: 106 MMOL/L (ref 97–108)
CO2 SERPL-SCNC: 31 MMOL/L (ref 21–32)
CREAT SERPL-MCNC: 1.05 MG/DL (ref 0.7–1.3)
ERYTHROCYTE [DISTWIDTH] IN BLOOD BY AUTOMATED COUNT: 11.9 % (ref 11.5–14.5)
GLOBULIN SER CALC-MCNC: 3.8 G/DL (ref 2–4)
GLUCOSE SERPL-MCNC: 78 MG/DL (ref 65–100)
GLUCOSE UR-MCNC: NEGATIVE MG/DL
HCT VFR BLD AUTO: 43.7 % (ref 36.6–50.3)
HGB BLD-MCNC: 14.2 G/DL (ref 12.1–17)
KETONES P FAST UR STRIP-MCNC: NEGATIVE MG/DL
LIPASE SERPL-CCNC: 120 U/L (ref 73–393)
MCH RBC QN AUTO: 29.1 PG (ref 26–34)
MCHC RBC AUTO-ENTMCNC: 32.5 G/DL (ref 30–36.5)
MCV RBC AUTO: 89.5 FL (ref 80–99)
NRBC # BLD: 0 K/UL (ref 0–0.01)
NRBC BLD-RTO: 0 PER 100 WBC
PH UR STRIP: 6 [PH] (ref 4.6–8)
PLATELET # BLD AUTO: 273 K/UL (ref 150–400)
PMV BLD AUTO: 10.7 FL (ref 8.9–12.9)
POTASSIUM SERPL-SCNC: 4.4 MMOL/L (ref 3.5–5.1)
PROT SERPL-MCNC: 8.2 G/DL (ref 6.4–8.2)
PROT UR QL STRIP: NEGATIVE
RBC # BLD AUTO: 4.88 M/UL (ref 4.1–5.7)
SODIUM SERPL-SCNC: 140 MMOL/L (ref 136–145)
SP GR UR STRIP: 1.02 (ref 1–1.03)
UA UROBILINOGEN AMB POC: NORMAL (ref 0.2–1)
URINALYSIS CLARITY POC: CLEAR
URINALYSIS COLOR POC: YELLOW
URINE BLOOD POC: NEGATIVE
URINE LEUKOCYTES POC: NEGATIVE
URINE NITRITES POC: NEGATIVE
WBC # BLD AUTO: 5.6 K/UL (ref 4.1–11.1)

## 2023-02-23 PROCEDURE — 99213 OFFICE O/P EST LOW 20 MIN: CPT | Performed by: STUDENT IN AN ORGANIZED HEALTH CARE EDUCATION/TRAINING PROGRAM

## 2023-02-23 PROCEDURE — 81003 URINALYSIS AUTO W/O SCOPE: CPT | Performed by: STUDENT IN AN ORGANIZED HEALTH CARE EDUCATION/TRAINING PROGRAM

## 2023-02-23 RX ORDER — CHOLECALCIFEROL TAB 125 MCG (5000 UNIT) 125 MCG
TAB ORAL DAILY
COMMUNITY

## 2023-02-23 RX ORDER — AMOXICILLIN 250 MG
2 CAPSULE ORAL DAILY
Qty: 30 TABLET | Refills: 0 | Status: SHIPPED | OUTPATIENT
Start: 2023-02-23 | End: 2023-03-09

## 2023-02-23 NOTE — PROGRESS NOTES
[address] [facility] [address] [place] [address]   Office [phone]  Fax [phone]     2/23/2023   Chief Complaint   Patient presents with    Abdominal Pain     Times 3 weeks. HPI:  Pranay Ferguson is a 28 y.o. male who presents to clinic today for sick visit    Abd pain:   - Mostly present in LUQ  - mild constant pressure but worsens with BM/gas  - Present for 3 weeks  - Seen at the [place] [address] ER: Vomiting, diarrhea, abd pain. Given IVF and antinausea meds. Since then vomiting resolved. - Currently constipated - no BM for 4 days. Taking OTC laxative Miralax 17g daily x2 days, which did not help. Had diarrhea before  - H/o appendectomy  - No Fhx of colon cancer  - Sister had IBS/Celiac  - Denies N/V, fever, weight loss, night sweats, melena   - Diet: eggs+toast+coffee, chicken/sardines+rice, baked chicken/stew, drinks milk    Patients past medical, surgical and family histories were reviewed. Allergies and Medications reviewed and updated. ROS: See HPI    Objective:  Vitals:    02/23/23 0808   BP: 110/66   Pulse: 90   Resp: 17   Temp: 97.3 °F (36.3 °C)   TempSrc: Temporal   SpO2: 97%   Weight: 261 lb (118.4 kg)   Height: 6' 3\" (1.905 m)     Body mass index is 32.62 kg/m². Physical Exam  General: Patient alert and oriented and in NAD  HEENT: PER/EOMI, no conjunctival pallor or scleral icterus. Heart: Regular rate and rhythm, No murmurs, rubs or gallops. 2+ peripheral pulses  Lungs: Clear to auscultation bilaterally, no wheezing, rales or rhonchi  Abd: +BS, non-distended. +Deep TTP in LUQ. No rebound or guarding. Ext: No edema  Skin: No rashes or lesions noted on exposed skin,  Psych: Appropriate mood and affect    KUB: +Stool present. No free intraabd air. Assessment and Plan:  Diagnoses and all orders for this visit:    1. Left upper quadrant abdominal pain  Assessment & Plan:  Acute. Ddx includes constipation (likely post viral gastroenteritis), pancreatic dysfnx.   - KUB   - Treat constipation with Senna-Pericolace 2 tabs daily for several days  - Check blood counts, liver fnx, kidney fnx, pancreatic fnx  - Check UA for blood  - Gave return/emergency room precuations    Orders:  -     CBC W/O DIFF; Future  -     METABOLIC PANEL, COMPREHENSIVE; Future  -     LIPASE; Future  -     XR ABD (KUB); Future  -     AMB POC URINALYSIS DIP STICK AUTO W/O MICRO  -     CELIAC DISEASE PROFILE (REFLEX TTG, IGG); Future    2. Atrial septal defect  Assessment & Plan:  Has known patent foramen ovale. Saw several cardiologists and had extensive work up per pt       3. Family history of celiac disease  -     CELIAC DISEASE PROFILE (REFLEX TTG, IGG); Future    4. Constipation, unspecified constipation type  -     senna-docusate (PERICOLACE) 8.6-50 mg per tablet; Take 2 Tablets by mouth daily for 14 days. Follow-up and Dispositions    Return in about 3 months (around 5/23/2023), or if symptoms worsen or fail to improve, for Annual physical.         I have discussed the aforementioned diagnoses and plan with the patient in detail. I have provided information in person and/or in AVS. All questions answered prior to discharge.      I discussed this patient with Dr. Markos De León (Attending Physician)   Signed By:  Lisa San MD     Family Medicine Resident

## 2023-02-23 NOTE — PROGRESS NOTES
Chief Complaint   Patient presents with    Abdominal Pain     Times 3 weeks. 1. Have you been to the ER, urgent care clinic since your last visit? Hospitalized since your last visit? Yes. [facility] ED for nausea and vomiting 1/2023.    2. Have you seen or consulted any other health care providers outside of the [address] [place] [address] since your last visit? Include any pap smears or colon screening.  No

## 2023-02-23 NOTE — PROGRESS NOTES
[address] Medicine Residency Attending Addendum:  Patient encounter was discussed on the day of the encounter with Bishnu Platt MD, performing the key elements of the service. I discussed the findings, assessment and plan with the resident and agree with the resident's findings and plan as documented in the resident's note.       Brenna Irby MD, CAQSM, RMSK

## 2023-02-26 LAB
ENDOMYSIUM IGA SER QL: NEGATIVE
IGA SERPL-MCNC: 287 MG/DL (ref 90–386)
TTG IGA SER-ACNC: <2 U/ML (ref 0–3)

## 2023-07-18 ENCOUNTER — OFFICE VISIT (OUTPATIENT)
Age: 33
End: 2023-07-18
Payer: MEDICAID

## 2023-07-18 DIAGNOSIS — Z71.1 CONCERN ABOUT EYE DISEASE WITHOUT DIAGNOSIS: Primary | ICD-10-CM

## 2023-07-18 DIAGNOSIS — R68.89 ITCHY EYES: ICD-10-CM

## 2023-07-18 DIAGNOSIS — T78.40XA ALLERGIC REACTION, INITIAL ENCOUNTER: ICD-10-CM

## 2023-07-18 PROCEDURE — 99213 OFFICE O/P EST LOW 20 MIN: CPT | Performed by: STUDENT IN AN ORGANIZED HEALTH CARE EDUCATION/TRAINING PROGRAM

## 2023-07-18 RX ORDER — FLUTICASONE PROPIONATE 50 MCG
1 SPRAY, SUSPENSION (ML) NASAL DAILY
Qty: 32 G | Refills: 1 | Status: SHIPPED | OUTPATIENT
Start: 2023-07-18

## 2023-07-18 SDOH — ECONOMIC STABILITY: FOOD INSECURITY: WITHIN THE PAST 12 MONTHS, YOU WORRIED THAT YOUR FOOD WOULD RUN OUT BEFORE YOU GOT MONEY TO BUY MORE.: NEVER TRUE

## 2023-07-18 SDOH — ECONOMIC STABILITY: FOOD INSECURITY: WITHIN THE PAST 12 MONTHS, THE FOOD YOU BOUGHT JUST DIDN'T LAST AND YOU DIDN'T HAVE MONEY TO GET MORE.: NEVER TRUE

## 2023-07-18 SDOH — ECONOMIC STABILITY: INCOME INSECURITY: HOW HARD IS IT FOR YOU TO PAY FOR THE VERY BASICS LIKE FOOD, HOUSING, MEDICAL CARE, AND HEATING?: NOT HARD AT ALL

## 2023-07-18 SDOH — ECONOMIC STABILITY: HOUSING INSECURITY
IN THE LAST 12 MONTHS, WAS THERE A TIME WHEN YOU DID NOT HAVE A STEADY PLACE TO SLEEP OR SLEPT IN A SHELTER (INCLUDING NOW)?: NO

## 2023-07-18 ASSESSMENT — PATIENT HEALTH QUESTIONNAIRE - PHQ9
1. LITTLE INTEREST OR PLEASURE IN DOING THINGS: 0
SUM OF ALL RESPONSES TO PHQ QUESTIONS 1-9: 0
SUM OF ALL RESPONSES TO PHQ9 QUESTIONS 1 & 2: 0
SUM OF ALL RESPONSES TO PHQ QUESTIONS 1-9: 0
2. FEELING DOWN, DEPRESSED OR HOPELESS: 0

## 2023-07-18 NOTE — PROGRESS NOTES
SUBJECTIVE  Dania Cardona is an 35 y.o. male who presents for eye itching     #itching  - tried contacts 2 days ago  - disposable contacts this time (last time contact solution had reacted with vision and it was cloudy for 2 days)  - no pain, no change in vision  - no headaches, no fever   - feels puffy   - no cough, congestion, runny nose  - tried some eye drops which helped reduce redness; clear eyes - just tried once last night and once this morning; makes it better but not go away  - nothing has made it truly go away  - has had corneal abrasions before and it doesn't feel like this    Allergies   Allergies   Allergen Reactions    Hydromorphone Anxiety         Medications   Current Outpatient Medications   Medication Sig    fluticasone (FLONASE) 50 MCG/ACT nasal spray 1 spray by Each Nostril route daily    Menaquinone-7 (VITAMIN K2 PO) Take by mouth    vitamin D3 (CHOLECALCIFEROL) 125 MCG (5000 UT) TABS tablet Take by mouth daily     No current facility-administered medications for this visit. Past surgical, medical and social history reviewed and updated as relevant to presenting concerns. ROS: See HPI      OBJECTIVE  There were no vitals taken for this visit. Physical Exam  General: No acute distress. HEENT: Conjunctiva are clear, sclera non-icteric. Mild scleral injection of bilateral eyes, R>L, EOMI, PERRLA  Respiratory: Normal work of breathing, talking in full sentences without issue  Musculoskeletal: Normal gait. Skin: No abnormalities or rashes   Neuro: Alert, oriented, speech clear and coherent  Psychiatric: Normal mood and affect        ASSESSMENT & PLAN  1. Concern about eye disease without diagnosis  2. Itchy eyes  Suspect allergic rhinitis. Vision intact bilaterally 20/20, pupils ERR, EOMI. No pain, no vision change. Counseled to call with change in symptoms. Low suspicion for viral or bacterial conjunctivitis. No suspicion for corneal abrasion.  Recommend follow up with

## 2023-07-20 VITALS
TEMPERATURE: 98.2 F | HEART RATE: 80 BPM | OXYGEN SATURATION: 98 % | DIASTOLIC BLOOD PRESSURE: 74 MMHG | SYSTOLIC BLOOD PRESSURE: 127 MMHG | BODY MASS INDEX: 32.5 KG/M2 | WEIGHT: 260 LBS

## 2023-11-03 ENCOUNTER — OFFICE VISIT (OUTPATIENT)
Age: 33
End: 2023-11-03
Payer: MEDICAID

## 2023-11-03 VITALS
TEMPERATURE: 97.3 F | SYSTOLIC BLOOD PRESSURE: 104 MMHG | DIASTOLIC BLOOD PRESSURE: 64 MMHG | BODY MASS INDEX: 31.75 KG/M2 | HEART RATE: 87 BPM | RESPIRATION RATE: 16 BRPM | OXYGEN SATURATION: 97 % | WEIGHT: 254 LBS

## 2023-11-03 DIAGNOSIS — B96.89 ACUTE BACTERIAL RHINOSINUSITIS: Primary | ICD-10-CM

## 2023-11-03 DIAGNOSIS — J01.90 ACUTE BACTERIAL RHINOSINUSITIS: Primary | ICD-10-CM

## 2023-11-03 PROCEDURE — 99213 OFFICE O/P EST LOW 20 MIN: CPT | Performed by: STUDENT IN AN ORGANIZED HEALTH CARE EDUCATION/TRAINING PROGRAM

## 2023-11-03 RX ORDER — AMOXICILLIN 875 MG/1
875 TABLET, COATED ORAL 2 TIMES DAILY
Qty: 10 TABLET | Refills: 0 | Status: SHIPPED | OUTPATIENT
Start: 2023-11-03 | End: 2023-11-08

## 2023-11-03 RX ORDER — AMOXICILLIN 875 MG/1
875 TABLET, COATED ORAL 2 TIMES DAILY
Qty: 20 TABLET | Refills: 0 | Status: SHIPPED | OUTPATIENT
Start: 2023-11-03 | End: 2023-11-03

## 2023-11-03 ASSESSMENT — PATIENT HEALTH QUESTIONNAIRE - PHQ9
SUM OF ALL RESPONSES TO PHQ9 QUESTIONS 1 & 2: 0
SUM OF ALL RESPONSES TO PHQ QUESTIONS 1-9: 0
2. FEELING DOWN, DEPRESSED OR HOPELESS: 0
1. LITTLE INTEREST OR PLEASURE IN DOING THINGS: 0
SUM OF ALL RESPONSES TO PHQ QUESTIONS 1-9: 0

## 2023-11-03 ASSESSMENT — ENCOUNTER SYMPTOMS
COUGH: 0
SINUS PAIN: 1
SHORTNESS OF BREATH: 0
RHINORRHEA: 1
SINUS PRESSURE: 1

## 2024-02-02 ENCOUNTER — OFFICE VISIT (OUTPATIENT)
Age: 34
End: 2024-02-02
Payer: MEDICAID

## 2024-02-02 VITALS
HEART RATE: 71 BPM | DIASTOLIC BLOOD PRESSURE: 64 MMHG | WEIGHT: 254.6 LBS | RESPIRATION RATE: 18 BRPM | SYSTOLIC BLOOD PRESSURE: 102 MMHG | BODY MASS INDEX: 31.65 KG/M2 | OXYGEN SATURATION: 95 % | HEIGHT: 75 IN

## 2024-02-02 DIAGNOSIS — M77.01 MEDIAL EPICONDYLITIS OF RIGHT ELBOW: ICD-10-CM

## 2024-02-02 DIAGNOSIS — M72.2 PLANTAR FASCIITIS: Primary | ICD-10-CM

## 2024-02-02 PROCEDURE — 99213 OFFICE O/P EST LOW 20 MIN: CPT | Performed by: STUDENT IN AN ORGANIZED HEALTH CARE EDUCATION/TRAINING PROGRAM

## 2024-02-02 ASSESSMENT — PATIENT HEALTH QUESTIONNAIRE - PHQ9
SUM OF ALL RESPONSES TO PHQ QUESTIONS 1-9: 0
SUM OF ALL RESPONSES TO PHQ QUESTIONS 1-9: 0
2. FEELING DOWN, DEPRESSED OR HOPELESS: 0
1. LITTLE INTEREST OR PLEASURE IN DOING THINGS: 0
SUM OF ALL RESPONSES TO PHQ QUESTIONS 1-9: 0
SUM OF ALL RESPONSES TO PHQ9 QUESTIONS 1 & 2: 0
SUM OF ALL RESPONSES TO PHQ QUESTIONS 1-9: 0

## 2024-02-02 NOTE — PROGRESS NOTES
Room: 18    Identified pt with two pt identifiers(name and ). Reviewed record in preparation for visit and have obtained necessary documentation.  Chief Complaint   Patient presents with    Elbow Pain     Right Elbow Pain. Think pain is related to overuse of joint. Currently in the process of building a home. Pain Assessment 2/10. Pain is worse with flexion and movement of the wrist and hand     Foot Pain     Bilateral Foot Pain. Patient is worse with lifting and walking, has decrease activity due to pain x 2 to 3 months. Denies Injury or Trauma related to pain   Pain Assessment Left Foot 3/10 and Right Foot 0/10        Health Maintenance Due   Topic    Hepatitis B vaccine (1 of 3 - 3-dose series)    COVID-19 Vaccine (1)    Varicella vaccine (1 of 2 - 2-dose childhood series)    HIV screen     Hepatitis C screen     DTaP/Tdap/Td vaccine (1 - Tdap)    Flu vaccine (1)       Vitals:    24 1548   BP: 102/64   Site: Right Upper Arm   Position: Sitting   Cuff Size: Large Adult   Pulse: 71   Resp: 18   SpO2: 95%   Weight: 115.5 kg (254 lb 9.6 oz)   Height: 1.905 m (6' 3\")           Coordination of Care Questionnaire:  :   1. Have you been to the ER, urgent care clinic since your last visit?  Hospitalized since your last visit?No    2. Have you seen or consulted any other health care providers outside of the [facility] since your last visit?  Include any pap smears or colon screening. No    This patient is accompanied in the office by his son.  I have received verbal consent from Joaquin Plaza to discuss any/all medical information while they are present in the room.

## 2024-02-02 NOTE — PROGRESS NOTES
[facility]    Subjective   Joaquin Plaza is a 33 y.o. male who presents for Elbow Pain (Right Elbow Pain. Think pain is related to overuse of joint. Currently in the process of building a home. Pain Assessment 2/10. Pain is worse with flexion and movement of the wrist and hand ) and Foot Pain (Bilateral Foot Pain. Patient is worse with lifting and walking, has decrease activity due to pain x 2 to 3 months. Denies Injury or Trauma related to pain /Pain Assessment Left Foot 3/10 and Right Foot 0/10)    R elbow pain  - soreness noticed about 2 month ago, while building house  - no distinct event  - worst medially  - worst with flexion and gripping    Bilat foot pain  - feels catching in R foot arch  - aching in bottom of arch of L foot  - worst in AM, better with stretching    Review of Systems   Review of Systems   Musculoskeletal:  Positive for myalgias. Negative for arthralgias, gait problem and joint swelling.   Skin:  Negative for wound.        Medical History  Past Medical History:   Diagnosis Date    Palpitations     Patent foramen ovale     with right to left shunt on bubble study in 2012 (at [facility])       Medications  Current Outpatient Medications   Medication Sig    diclofenac sodium (VOLTAREN) 1 % GEL Apply 4 g topically 4 times daily    fluticasone (FLONASE) 50 MCG/ACT nasal spray 1 spray by Each Nostril route daily    Menaquinone-7 (VITAMIN K2 PO) Take by mouth daily    vitamin D3 (CHOLECALCIFEROL) 125 MCG (5000 UT) TABS tablet Take by mouth daily     No current facility-administered medications for this visit.       Immunizations     There is no immunization history on file for this patient.    Allergies   Allergies   Allergen Reactions    Cat Hair Extract      Other Reaction(s): CONGESTION,RUNNY NOSE,ITCHY EYES    Pollen Extract      Other Reaction(s): CONGESTION,ITCHY EYES, RUNNY NOSE    Hydromorphone Anxiety       Objective   Vital

## 2024-02-08 NOTE — PROGRESS NOTES
I discussed the patient's history and physical exam with the resident. I reviewed the assessment and plan and agree with the resident's documentation.

## 2024-05-31 ENCOUNTER — OFFICE VISIT (OUTPATIENT)
Age: 34
End: 2024-05-31
Payer: MEDICAID

## 2024-05-31 VITALS
WEIGHT: 257.5 LBS | OXYGEN SATURATION: 95 % | DIASTOLIC BLOOD PRESSURE: 57 MMHG | HEIGHT: 75 IN | HEART RATE: 59 BPM | TEMPERATURE: 98.1 F | SYSTOLIC BLOOD PRESSURE: 101 MMHG | RESPIRATION RATE: 18 BRPM | BODY MASS INDEX: 32.02 KG/M2

## 2024-05-31 DIAGNOSIS — J30.2 SEASONAL ALLERGIES: Primary | ICD-10-CM

## 2024-05-31 PROCEDURE — 99213 OFFICE O/P EST LOW 20 MIN: CPT | Performed by: STUDENT IN AN ORGANIZED HEALTH CARE EDUCATION/TRAINING PROGRAM

## 2024-05-31 RX ORDER — CETIRIZINE HYDROCHLORIDE 10 MG/1
10 TABLET ORAL DAILY
Qty: 90 TABLET | Refills: 1 | Status: SHIPPED | OUTPATIENT
Start: 2024-05-31

## 2024-05-31 ASSESSMENT — PATIENT HEALTH QUESTIONNAIRE - PHQ9
1. LITTLE INTEREST OR PLEASURE IN DOING THINGS: NOT AT ALL
2. FEELING DOWN, DEPRESSED OR HOPELESS: NOT AT ALL
SUM OF ALL RESPONSES TO PHQ QUESTIONS 1-9: 0
SUM OF ALL RESPONSES TO PHQ9 QUESTIONS 1 & 2: 0
SUM OF ALL RESPONSES TO PHQ QUESTIONS 1-9: 0

## 2024-05-31 NOTE — PROGRESS NOTES
I reviewed with the resident the medical history and the resident's findings on the physical examination.  I discussed with the resident the patient's diagnosis and concur with the plan.     Mariana Sanderson MD 5/31/2024

## 2024-05-31 NOTE — PROGRESS NOTES
Identified pt with two pt identifiers(name and ). Reviewed record in preparation for visit and have obtained necessary documentation.  Chief Complaint   Patient presents with    Eye Problem     Itchy x 2 weeks, mucus and watery in the morning        Health Maintenance Due   Topic    Hepatitis B vaccine (1 of 3 - 3-dose series)    COVID-19 Vaccine (1)    Varicella vaccine (1 of 2 - 2-dose childhood series)    HIV screen     Hepatitis C screen     DTaP/Tdap/Td vaccine (1 - Tdap)       Vitals:    24 1439 24 1448   BP: (!) 105/59 (!) 101/57   Site: Left Upper Arm Right Upper Arm   Position: Sitting Sitting   Cuff Size: Medium Adult Medium Adult   Pulse: 59    Resp: 18    Temp: 98.1 °F (36.7 °C)    TempSrc: Oral    SpO2: 95%    Weight: 116.8 kg (257 lb 8 oz)    Height: 1.905 m (6' 3\")          \"Have you been to the ER, urgent care clinic since your last visit?  Hospitalized since your last visit?\"    YES - When: approximately 4 months ago.  Where and Why: [facility] and Food poisoning.    “Have you seen or consulted any other health care providers outside of [facility] since your last visit?”    NO            Click Here for Release of Records Request     This patient is accompanied in the office by his self.  I have received verbal consent from Joaquin Plaza to discuss any/all medical information while they are present in the room.

## 2024-05-31 NOTE — PROGRESS NOTES
[id number] [place] [address]   Office [phone], Fax [phone]    Subjective:     Chief Complaint   Patient presents with    Eye Problem     Itchy x 2 weeks, mucus and watery in the morning       HPI:  Joaquin Plaza is a 34 y.o. male that presents for: Seasonal allergies.  Symptoms were previously well-controlled with Claritin but for the last 2 weeks he has been having itchy, red eyes, especially in the morning.  He denies any discharge from the eyes or any vision changes.  He also denies any chest pain, shortness of breath, fever, chills, nausea, vomiting, diarrhea or any other complaints time.      ROS:   Review of Systems      Health Maintenance:  Health Maintenance Due   Topic Date Due    Hepatitis B vaccine (1 of 3 - 3-dose series) Never done    COVID-19 Vaccine (1) Never done    Varicella vaccine (1 of 2 - 2-dose childhood series) Never done    HIV screen  Never done    Hepatitis C screen  Never done    DTaP/Tdap/Td vaccine (1 - Tdap) Never done        Past Medical Hx  I personally reviewed.  Past Medical History:   Diagnosis Date    Palpitations     Patent foramen ovale     with right to left shunt on bubble study in 2012 (at [facility] [place])        SocHx   I personally reviewed.  Social Determinants of Health     Tobacco Use: Low Risk  (5/31/2024)    Patient History     Smoking Tobacco Use: Never     Smokeless Tobacco Use: Never     Passive Exposure: Never   Alcohol Use: Not on file   Financial Resource Strain: Low Risk  (7/18/2023)    Overall Financial Resource Strain (CARDIA)     Difficulty of Paying Living Expenses: Not hard at all   Food Insecurity: Not on file (7/18/2023)   Transportation Needs: Unknown (7/18/2023)    PRAPARE - Transportation     Lack of Transportation (Medical): Not on file     Lack of Transportation (Non-Medical): No   Physical Activity: Not on file   Stress: Not on file   Social Connections: Not on file   Intimate Partner Violence: Not on file   Depression: Not at  plan to f/u outpt given normal labs, and workup in ED